# Patient Record
Sex: FEMALE | Race: WHITE | NOT HISPANIC OR LATINO | Employment: OTHER
[De-identification: names, ages, dates, MRNs, and addresses within clinical notes are randomized per-mention and may not be internally consistent; named-entity substitution may affect disease eponyms.]

---

## 2018-01-22 NOTE — TELEPHONE ENCOUNTER
APPT INFO    Date /Time: 3/7/18   Reason for Appt: Right Hip AVN/R Leg Weakness/R Sided Sciatica   Ref Provider/Clinic: Self   Are there internal records? Yes/No?  IF YES, list clinic names: No   Are there outside records? Yes/No? Yes     Patient Contact (Y/N) & Call Details: Yes  [facility] [place] and [facility] Iglesias; h/o R L4-5 HL & D 9/15; L L5-S1 HL & MD 1/01 -- See CareEverywhere tab in EPIC  CDI MR 12/5/17   Action: Reviewed CE records and requested imaging     OUTSIDE RECORDS CHECKLIST     CLINIC NAME COMMENTS REC (x) IMG (x)   [facility] OFFICE NOTES: 1/5/18, 7/24/15  RADIOLOGY: 1/8/18 MR R Hip, 10/27/17 & 6/7/13 Bone Density, 7/24/15 MR L Spine  ER/HOSP: 9/2/15 Hosp, 7/24/15 ED  OP NOTES: 9/2/15 Right L4-5 HL & D, 1/2001 Left L5-S1 HL & MD x x   CDI  na x

## 2018-01-29 ENCOUNTER — HEALTH MAINTENANCE LETTER (OUTPATIENT)
Age: 63
End: 2018-01-29

## 2018-01-29 NOTE — TELEPHONE ENCOUNTER
Records Received From: [facility]    Date/Exam/Location  (specify location if different)   Operative Notes & Implant Records: 1/14/00-Left L-5-S1 hemilaminectomy & MD

## 2018-03-07 ENCOUNTER — RADIANT APPOINTMENT (OUTPATIENT)
Dept: GENERAL RADIOLOGY | Facility: CLINIC | Age: 63
End: 2018-03-07
Attending: ORTHOPAEDIC SURGERY
Payer: COMMERCIAL

## 2018-03-07 ENCOUNTER — PRE VISIT (OUTPATIENT)
Dept: ORTHOPEDICS | Facility: CLINIC | Age: 63
End: 2018-03-07

## 2018-03-07 ENCOUNTER — OFFICE VISIT (OUTPATIENT)
Dept: ORTHOPEDICS | Facility: CLINIC | Age: 63
End: 2018-03-07
Payer: COMMERCIAL

## 2018-03-07 VITALS — WEIGHT: 133 LBS | HEIGHT: 62 IN | BODY MASS INDEX: 24.48 KG/M2

## 2018-03-07 DIAGNOSIS — M25.551 HIP PAIN, RIGHT: ICD-10-CM

## 2018-03-07 DIAGNOSIS — M16.11 PRIMARY OSTEOARTHRITIS OF RIGHT HIP: Primary | ICD-10-CM

## 2018-03-07 DIAGNOSIS — M25.551 HIP PAIN, RIGHT: Primary | ICD-10-CM

## 2018-03-07 RX ORDER — LYSINE HCL 500 MG
1 TABLET ORAL
COMMUNITY
Start: 2015-04-09

## 2018-03-07 RX ORDER — ASPIRIN 81 MG/1
TABLET ORAL
COMMUNITY
Start: 2015-09-03

## 2018-03-07 RX ORDER — FOLIC ACID 1 MG/1
TABLET ORAL
COMMUNITY
Start: 2007-11-08

## 2018-03-07 ASSESSMENT — HOOS JR
WHAT AMOUNT OF HIP PAIN HAVE YOU EXPERIENCED THE LAST WEEK DURING THE FOLLOWING ACTIVITIES: 1
WALKING ON UNEVEN SURFACE: 2
LYING IN BED (TURNING OVER, MAINTAINING HIP POSITION): 3
RISING FROM SITTING: 3
HOOS JR TOTAL INTERVAL SCORE: 64.66
SITTING: 1
BENDING TO THE FLOOR TO PICK UP OBJECT: 2
HOOS JR FUNCTION SCORE: 1
GOING UP OR DOWN STAIRS: 3

## 2018-03-07 ASSESSMENT — ENCOUNTER SYMPTOMS
ARTHRALGIAS: 1
MUSCLE WEAKNESS: 1
MUSCLE CRAMPS: 1
NECK PAIN: 1
BACK PAIN: 1
JOINT SWELLING: 0
MYALGIAS: 1
STIFFNESS: 1

## 2018-03-07 NOTE — MR AVS SNAPSHOT
"              After Visit Summary   3/7/2018    Jennifer Dubon    MRN: [id number]           Patient Information     Date Of Birth          1955        Visit Information        Provider Department      3/7/2018 3:00 PM Bruce Gordon MD [facility]        Today's Diagnoses     Primary osteoarthritis of right hip    -  1       Follow-ups after your visit        Who to contact     Please call your clinic at [phone] to:    Ask questions about your health    Make or cancel appointments    Discuss your medicines    Learn about your test results    Speak to your doctor            Additional Information About Your Visit        MyChart Information     [employer] gives you secure access to your electronic health record. If you see a primary care provider, you can also send messages to your care team and make appointments. If you have questions, please call your primary care clinic.  If you do not have a primary care provider, please call [phone] and they will assist you.      [employer] is an electronic gateway that provides easy, online access to your medical records. With [employer], you can request a clinic appointment, read your test results, renew a prescription or communicate with your care team.     To access your existing account, please contact your [facility] or call [phone] for assistance.        Care EveryWhere ID     This is your Care EveryWhere ID. This could be used by other organizations to access your [place] medical records  [id number]        Your Vitals Were     Height BMI (Body Mass Index)                1.575 m (5' 2\") 24.33 kg/m2           Blood Pressure from Last 3 Encounters:   No data found for BP    Weight from Last 3 Encounters:   03/07/18 60.3 kg (133 lb)              Today, you had the following     No orders found for display       Primary Care Provider    None Specified       No primary provider on file.        Equal Access to " Services     [facility]: Hadii aad ku hadaddyvishnu Medrano, wagwynda luqadaha, qaybta kajoniroldan butler . So [facility] [phone].    ATENCIÓN: Si habla español, tiene a smith disposición servicios gratuitos de asistencia lingüística. Llame al [phone].    We comply with applicable federal civil rights laws and [place] laws. We do not discriminate on the basis of race, color, national origin, age, disability, sex, sexual orientation, or gender identity.            Thank you!     Thank you for choosing [facility]  for your care. Our goal is always to provide you with excellent care. Hearing back from our patients is one way we can continue to improve our services. Please take a few minutes to complete the written survey that you may receive in the mail after your visit with us. Thank you!             Your Updated Medication List - Protect others around you: Learn how to safely use, store and throw away your medicines at www.disposemymeds.org.          This list is accurate as of 3/7/18 11:59 PM.  Always use your most recent med list.                   Brand Name Dispense Instructions for use Diagnosis    aspirin EC 81 MG EC tablet      Resume on 9/5/15        CALCIUM 600+D PLUS MINERALS 600-400 MG-UNIT Tabs      Take 1 tablet by mouth        folic acid 1 MG tablet    FOLVITE          Selenium 100 MCG Caps      Take 100 mcg by mouth        vitamin D3 1000 UNITS Caps      Take 1 capsule by mouth

## 2018-03-07 NOTE — NURSING NOTE
"Reason For Visit:   Chief Complaint   Patient presents with     Consult     Right hip AVN.        Primary MD: No primary care provider on file.        Ht 1.575 m (5' 2\")  Wt 60.3 kg (133 lb)  BMI 24.33 kg/m2      Current Outpatient Prescriptions   Medication     aspirin EC 81 MG EC tablet     Calcium Carbonate-Vit D-Min (CALCIUM 600+D PLUS MINERALS) 600-400 MG-UNIT TABS     Cholecalciferol (VITAMIN D3) 1000 UNITS CAPS     folic acid (FOLVITE) 1 MG tablet     Selenium 100 MCG CAPS     No current facility-administered medications for this visit.           Allergies   Allergen Reactions     Sulfa Drugs Rash           Lorraine Zimmerman LPN    "

## 2018-03-07 NOTE — LETTER
3/7/2018       RE: Jennifer Dubon  [address] [facility] [address]     Dear Colleague,    Thank you for referring your patient, Jennifer Dubon, to the [facility] at [facility]. Please see a copy of my visit note below.    [facility], Orthopaedic Surgery, Arthritis, Hip and Knee Replacement    Jennifer Dubon MRN# [id number]   Age: 62 year old YOB: 1955     Requesting physician: Referred Self              History of Present Illness:   Jennifer Dubon is a 62 year old year old female who presents today for evaluation and management of right hip pain.  One is a very pleasant 62-year-old woman who has had right hip and back issues for quite some time.  She has a history of 2 prior lumbar decompression surgeries.  She is also been followed for lumbar degenerative disease and is considering having a lower lumbar spinal fusion.  In the workup process for that she was found to have end-stage arthritis of her right hip.  She notes that currently she has pain localized to her right groin.  She notes this pain is worse with weightbearing activities, particularly if she goes for longer walks.  Thus far she has not done any treatment in particular for the hip and groin pain.  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  She also notes having severe recurrent lower lumbar pain that is severely disabling when this type of pain occurs.  However, this pain is less frequent.  She has not had any prior injections to her right hip.  She occasionally takes anti-inflammatory medications.  She is very active and otherwise healthy and enjoys exercises.             Past Medical History:   There is no problem list on file for this patient.    No past medical history on file.  Prior history of blood clot: none  Prior history of bleeding problems: none  Prior history of anesthetic complications:  "none  Currently on opioids:  none  History of Diabetes: no           Past Surgical History:     Past Surgical History:   Procedure Laterality Date     BACK SURGERY       HC SACROPLASTY              Social History:     Social History     Social History     Marital status:      Spouse name: N/A     Number of children: N/A     Years of education: N/A     Occupational History     Not on file.     Social History Main Topics     Smoking status: Never Smoker     Smokeless tobacco: Never Used     Alcohol use Yes     Drug use: No     Sexual activity: Yes     Partners: Male     Birth control/ protection: Post-menopausal     Other Topics Concern     Not on file     Social History Narrative     No narrative on file     Smoking: Non smoker.           Family History:       Family History   Problem Relation Age of Onset     CANCER Sister      Other Cancer Sister      Hypertension Father      Hyperlipidemia Father      Hyperlipidemia Mother      Coronary Artery Disease Mother             Medications:     Current Outpatient Prescriptions   Medication Sig     aspirin EC 81 MG EC tablet Resume on 9/5/15     Calcium Carbonate-Vit D-Min (CALCIUM 600+D PLUS MINERALS) 600-400 MG-UNIT TABS Take 1 tablet by mouth     Cholecalciferol (VITAMIN D3) 1000 UNITS CAPS Take 1 capsule by mouth     folic acid (FOLVITE) 1 MG tablet      Selenium 100 MCG CAPS Take 100 mcg by mouth     No current facility-administered medications for this visit.             Review of Systems:   A comprehensive 10 point review of systems (constitutional, ENT, cardiac, peripheral vascular, lymphatic, respiratory, GI, , Musculoskeletal, skin, Neurological) was performed and found to be negative except as described in this note.     Also see intake form completed by patient.             Physical Exam:     EXAMINATION pertinent findings:   VITAL SIGNS: Height 1.575 m (5' 2\"), weight 60.3 kg (133 lb).  Body mass index is 24.33 kg/(m^2).  GEN: AOx3, cooperative, no " distress  RESP: non labored breathing   ABD: benign   SKIN: grossly normal   LYMPHATIC: grossly normal   NEURO: grossly normal   VASCULAR: satisfactory perfusion of all extremities  MUSCULOSKELETAL:   She walks with a mildly antalgic gait with decreased stance phase on the right lower extremity.  Negative Trendelenburg sign.  She has mild pain with active straight leg raise which localizes to the groin.  Her hip range of motion is from 0-100 with 10  of internal rotation 40  of external rotation.  She has pain localized to the groin with flexion and internal rotation.  She has no pain with knee range of motion.  She has a negative passive straight leg raise.  She has no pain with range of motion of her left lower extremity.  She has no right knee tenderness palpation.  Ankle plantarflexion and dorsiflexion is intact.  She has normal sensation throughout her foot.  She has no tenderness palpation over the greater trochanter bilaterally.             Data:   Imaging:   Plain imaging was reviewed demonstrating end-stage bone-on-bone right hip arthritis.  She also has notable degenerative lumbar disease.         Assessment and Plan:   Assessment:  Jennifer is a very pleasant 62-year-old woman with end-stage right hip arthritis as well as degenerative lumbar disease.  She has been followed by neurosurgery for her degenerative lumbar disease.  She is considering going lumbar spinal fusion later this fall.  We had a long discussion regarding the natural history of her right hip arthritis as well as ongoing management options including surgical versus nonsurgical.  We discussed hip replacement in detail including the procedure, the implants, the longevity, the risks and benefits of surgery.  We also discussed the unique characteristics of considering total hip replacement in the setting of end-stage degenerative lumbar disease and spinal fusion.  We discussed a slightly higher risk of dislocation.  We discussed whether or not  it would be better to pursue hip replacement prior to or after lumbar spinal fusion.  My opinion is that it would be best to address which ever bothers her more.  At this point she does quite well with both, however when she has the episodes of lumbar pain she has severe shooting very debilitating pain and is considering undergoing lumbar surgery prior to that.  From a hip replacement standpoint this would be acceptable.  We discussed that after lumbar fusion total hip replacement is a slightly higher risk of dislocation.  If we are to do the hip replacement first and the hip was stable in a certain position we discussed that realign her spine could put her at higher risk for dislocation.  We further discussed that in some scenarios we would consider using a different construct such as dual mobility to further minimize the risk of dislocation in the setting of end-stage lumbar disease.  At this point she is considering proceeding with lumbar spinal fusion based on the recommendations of the neurosurgeon she is following.  I will see her back in clinic and she will keep us updated as to how her symptoms are progressing over time.  Otherwise she can remain in touch by email she is emailed in the past.  All of her questions were answered and she satisfied with this treatment.    Bruce Gordon M.D.     Arthritis and Joint Replacement  Department of Orthopaedic Surgery, [facility]  [email or web address]  [phone] (pager)           Review of Systems:       Answers for HPI/ROS submitted by the patient on 3/7/2018   General Symptoms: No  Skin Symptoms: No  HENT Symptoms: No  EYE SYMPTOMS: No  HEART SYMPTOMS: No  LUNG SYMPTOMS: No  INTESTINAL SYMPTOMS: No  URINARY SYMPTOMS: No  GYNECOLOGIC SYMPTOMS: No  BREAST SYMPTOMS: No  SKELETAL SYMPTOMS: Yes  BLOOD SYMPTOMS: No  NERVOUS SYSTEM SYMPTOMS: No  MENTAL HEALTH SYMPTOMS: No  Back pain: Yes  Muscle aches: Yes  Neck pain: Yes  Swollen joints:  No  Joint pain: Yes  Bone pain: Yes  Muscle cramps: Yes  Muscle weakness: Yes  Joint stiffness: Yes  Bone fracture: No      Again, thank you for allowing me to participate in the care of your patient.      Sincerely,    Bruce Gordon MD

## 2018-03-07 NOTE — LETTER
Date:March 15, 2018      Patient was self referred, no letter generated. Do not send.        [facility] Health Information

## 2018-03-14 NOTE — PROGRESS NOTES
[facility], Orthopaedic Surgery, Arthritis, Hip and Knee Replacement    Jennifer Dubon MRN# [id number]   Age: 62 year old YOB: 1955     Requesting physician: Referred Self              History of Present Illness:   Jennifer Dubon is a 62 year old year old female who presents today for evaluation and management of right hip pain.  One is a very pleasant 62-year-old woman who has had right hip and back issues for quite some time.  She has a history of 2 prior lumbar decompression surgeries.  She is also been followed for lumbar degenerative disease and is considering having a lower lumbar spinal fusion.  In the workup process for that she was found to have end-stage arthritis of her right hip.  She notes that currently she has pain localized to her right groin.  She notes this pain is worse with weightbearing activities, particularly if she goes for longer walks.  Thus far she has not done any treatment in particular for the hip and groin pain.  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  She also notes having severe recurrent lower lumbar pain that is severely disabling when this type of pain occurs.  However, this pain is less frequent.  She has not had any prior injections to her right hip.  She occasionally takes anti-inflammatory medications.  She is very active and otherwise healthy and enjoys exercises.             Past Medical History:   There is no problem list on file for this patient.    No past medical history on file.  Prior history of blood clot: none  Prior history of bleeding problems: none  Prior history of anesthetic complications: none  Currently on opioids:  none  History of Diabetes: no           Past Surgical History:     Past Surgical History:   Procedure Laterality Date     BACK SURGERY       HC SACROPLASTY              Social History:     Social History     Social History     Marital status:      Spouse  "name: N/A     Number of children: N/A     Years of education: N/A     Occupational History     Not on file.     Social History Main Topics     Smoking status: Never Smoker     Smokeless tobacco: Never Used     Alcohol use Yes     Drug use: No     Sexual activity: Yes     Partners: Male     Birth control/ protection: Post-menopausal     Other Topics Concern     Not on file     Social History Narrative     No narrative on file     Smoking: Non smoker.           Family History:       Family History   Problem Relation Age of Onset     CANCER Sister      Other Cancer Sister      Hypertension Father      Hyperlipidemia Father      Hyperlipidemia Mother      Coronary Artery Disease Mother             Medications:     Current Outpatient Prescriptions   Medication Sig     aspirin EC 81 MG EC tablet Resume on 9/5/15     Calcium Carbonate-Vit D-Min (CALCIUM 600+D PLUS MINERALS) 600-400 MG-UNIT TABS Take 1 tablet by mouth     Cholecalciferol (VITAMIN D3) 1000 UNITS CAPS Take 1 capsule by mouth     folic acid (FOLVITE) 1 MG tablet      Selenium 100 MCG CAPS Take 100 mcg by mouth     No current facility-administered medications for this visit.             Review of Systems:   A comprehensive 10 point review of systems (constitutional, ENT, cardiac, peripheral vascular, lymphatic, respiratory, GI, , Musculoskeletal, skin, Neurological) was performed and found to be negative except as described in this note.     Also see intake form completed by patient.             Physical Exam:     EXAMINATION pertinent findings:   VITAL SIGNS: Height 1.575 m (5' 2\"), weight 60.3 kg (133 lb).  Body mass index is 24.33 kg/(m^2).  GEN: AOx3, cooperative, no distress  RESP: non labored breathing   ABD: benign   SKIN: grossly normal   LYMPHATIC: grossly normal   NEURO: grossly normal   VASCULAR: satisfactory perfusion of all extremities  MUSCULOSKELETAL:   She walks with a mildly antalgic gait with decreased stance phase on the right lower " extremity.  Negative Trendelenburg sign.  She has mild pain with active straight leg raise which localizes to the groin.  Her hip range of motion is from 0-100 with 10  of internal rotation 40  of external rotation.  She has pain localized to the groin with flexion and internal rotation.  She has no pain with knee range of motion.  She has a negative passive straight leg raise.  She has no pain with range of motion of her left lower extremity.  She has no right knee tenderness palpation.  Ankle plantarflexion and dorsiflexion is intact.  She has normal sensation throughout her foot.  She has no tenderness palpation over the greater trochanter bilaterally.             Data:   Imaging:   Plain imaging was reviewed demonstrating end-stage bone-on-bone right hip arthritis.  She also has notable degenerative lumbar disease.         Assessment and Plan:   Assessment:  Jennifer is a very pleasant 62-year-old woman with end-stage right hip arthritis as well as degenerative lumbar disease.  She has been followed by neurosurgery for her degenerative lumbar disease.  She is considering going lumbar spinal fusion later this fall.  We had a long discussion regarding the natural history of her right hip arthritis as well as ongoing management options including surgical versus nonsurgical.  We discussed hip replacement in detail including the procedure, the implants, the longevity, the risks and benefits of surgery.  We also discussed the unique characteristics of considering total hip replacement in the setting of end-stage degenerative lumbar disease and spinal fusion.  We discussed a slightly higher risk of dislocation.  We discussed whether or not it would be better to pursue hip replacement prior to or after lumbar spinal fusion.  My opinion is that it would be best to address which ever bothers her more.  At this point she does quite well with both, however when she has the episodes of lumbar pain she has severe shooting very  debilitating pain and is considering undergoing lumbar surgery prior to that.  From a hip replacement standpoint this would be acceptable.  We discussed that after lumbar fusion total hip replacement is a slightly higher risk of dislocation.  If we are to do the hip replacement first and the hip was stable in a certain position we discussed that realign her spine could put her at higher risk for dislocation.  We further discussed that in some scenarios we would consider using a different construct such as dual mobility to further minimize the risk of dislocation in the setting of end-stage lumbar disease.  At this point she is considering proceeding with lumbar spinal fusion based on the recommendations of the neurosurgeon she is following.  I will see her back in clinic and she will keep us updated as to how her symptoms are progressing over time.  Otherwise she can remain in touch by email she is emailed in the past.  All of her questions were answered and she satisfied with this treatment.    Bruce Gordon M.D.     Arthritis and Joint Replacement  Department of Orthopaedic Surgery, [facility]  [email or web address]  [phone] (pager)           Review of Systems:       Answers for HPI/ROS submitted by the patient on 3/7/2018   General Symptoms: No  Skin Symptoms: No  HENT Symptoms: No  EYE SYMPTOMS: No  HEART SYMPTOMS: No  LUNG SYMPTOMS: No  INTESTINAL SYMPTOMS: No  URINARY SYMPTOMS: No  GYNECOLOGIC SYMPTOMS: No  BREAST SYMPTOMS: No  SKELETAL SYMPTOMS: Yes  BLOOD SYMPTOMS: No  NERVOUS SYSTEM SYMPTOMS: No  MENTAL HEALTH SYMPTOMS: No  Back pain: Yes  Muscle aches: Yes  Neck pain: Yes  Swollen joints: No  Joint pain: Yes  Bone pain: Yes  Muscle cramps: Yes  Muscle weakness: Yes  Joint stiffness: Yes  Bone fracture: No

## 2020-03-11 ENCOUNTER — HEALTH MAINTENANCE LETTER (OUTPATIENT)
Age: 65
End: 2020-03-11

## 2021-01-03 ENCOUNTER — HEALTH MAINTENANCE LETTER (OUTPATIENT)
Age: 66
End: 2021-01-03

## 2021-02-26 ENCOUNTER — DOCUMENTATION ONLY (OUTPATIENT)
Dept: CARE COORDINATION | Facility: CLINIC | Age: 66
End: 2021-02-26

## 2021-02-26 ASSESSMENT — ACTIVITIES OF DAILY LIVING (ADL)
ADL_MEAN: 2.23
ADL_SUBSCALE_SCORE: 44.11
ADL_SUM: 38

## 2021-02-26 ASSESSMENT — HOOS S4: HOW SEVERE IS YOUR HIP JOINT STIFFNESS AFTER FIRST WAKENING IN THE MORNING?: MODERATE

## 2021-03-01 DIAGNOSIS — M25.551 RIGHT HIP PAIN: Primary | ICD-10-CM

## 2021-03-03 ENCOUNTER — OFFICE VISIT (OUTPATIENT)
Dept: ORTHOPEDICS | Facility: CLINIC | Age: 66
End: 2021-03-03
Payer: COMMERCIAL

## 2021-03-03 ENCOUNTER — ANCILLARY PROCEDURE (OUTPATIENT)
Dept: GENERAL RADIOLOGY | Facility: CLINIC | Age: 66
End: 2021-03-03
Attending: ORTHOPAEDIC SURGERY
Payer: COMMERCIAL

## 2021-03-03 DIAGNOSIS — M16.11 PRIMARY LOCALIZED OSTEOARTHRITIS OF RIGHT HIP: Primary | ICD-10-CM

## 2021-03-03 DIAGNOSIS — M25.551 RIGHT HIP PAIN: ICD-10-CM

## 2021-03-03 PROCEDURE — 99214 OFFICE O/P EST MOD 30 MIN: CPT | Performed by: ORTHOPAEDIC SURGERY

## 2021-03-03 PROCEDURE — 73502 X-RAY EXAM HIP UNI 2-3 VIEWS: CPT | Mod: RT | Performed by: RADIOLOGY

## 2021-03-03 RX ORDER — ROSUVASTATIN CALCIUM 10 MG/1
10 TABLET, COATED ORAL
COMMUNITY
Start: 2020-02-01

## 2021-03-03 NOTE — PROGRESS NOTES
[facility], Orthopaedic Surgery, Arthritis, Hip and Knee Replacement    Jennifer Dubon MRN# [id number]   Age: 62 year old YOB: 1955     Requesting physician: Referred Self              History of Present Illness:   Jennifer returns for follow-up for right hip osteoarthritis.  I last saw her a couple years ago.  At that point she was noted to have degenerative right hip arthritis as well as some ongoing issues with her back.  Following that visit she did not pursue any further treatment for the back.  She did also not had any intervention for the hip.  She retired from dentistry in 2019.  Over the last few months she has had a great deal of increased right hip pain.  She does not have any ongoing back pain.  She does have persistent numbness in the right shin.  The symptoms have been stable since her prior back surgery in 2015.  She has otherwise no changes to her back symptoms or any new radicular type symptoms.  More recently, the symptoms that have bothered her her pain in her groin and buttock and lateral hip.  The symptoms are worse with weightbearing activities.  She also notes increasing difficulty with her gait.  She notes that she has an antalgic gait due to pain on the right hip.  She is not using any assistive devices.  It has become more more difficult to remain active.  She can no longer walk long distances.  She takes anti-inflammatory medications which do help with her symptoms somewhat.  Due to the severity of the symptoms and increased impact is having on the quality of her life she is interested in considering more definitive treatment management options at this time.           Past Medical History:   There is no problem list on file for this patient.    No past medical history on file.  Prior history of blood clot: none  Prior history of bleeding problems: none  Prior history of anesthetic complications: none  Currently on opioids:  none  History of Diabetes: no           Past  Surgical History:     Past Surgical History:   Procedure Laterality Date     BACK SURGERY       HC SACROPLASTY              Social History:     Social History     Socioeconomic History     Marital status:      Spouse name: Not on file     Number of children: Not on file     Years of education: Not on file     Highest education level: Not on file   Occupational History     Not on file   Social Needs     Financial resource strain: Not on file     Food insecurity     Worry: Not on file     Inability: Not on file     Transportation needs     Medical: Not on file     Non-medical: Not on file   Tobacco Use     Smoking status: Never Smoker     Smokeless tobacco: Never Used   Substance and Sexual Activity     Alcohol use: Yes     Drug use: No     Sexual activity: Yes     Partners: Male     Birth control/protection: Post-menopausal   Lifestyle     Physical activity     Days per week: Not on file     Minutes per session: Not on file     Stress: Not on file   Relationships     Social connections     Talks on phone: Not on file     Gets together: Not on file     Attends Sabianist service: Not on file     Active member of club or organization: Not on file     Attends meetings of clubs or organizations: Not on file     Relationship status: Not on file     Intimate partner violence     Fear of current or ex partner: Not on file     Emotionally abused: Not on file     Physically abused: Not on file     Forced sexual activity: Not on file   Other Topics Concern     Not on file   Social History Narrative     Not on file     Smoking: Non smoker.           Family History:       Family History   Problem Relation Age of Onset     Cancer Sister      Other Cancer Sister      Hypertension Father      Hyperlipidemia Father      Hyperlipidemia Mother      Coronary Artery Disease Mother             Medications:     Current Outpatient Medications   Medication Sig     aspirin EC 81 MG EC tablet Resume on 9/5/15     Calcium Carbonate-Vit  D-Min (CALCIUM 600+D PLUS MINERALS) 600-400 MG-UNIT TABS Take 1 tablet by mouth     Cholecalciferol (VITAMIN D3) 1000 UNITS CAPS Take 1 capsule by mouth     folic acid (FOLVITE) 1 MG tablet      rosuvastatin (CRESTOR) 10 MG tablet Take 10 mg by mouth     Selenium 100 MCG CAPS Take 100 mcg by mouth     No current facility-administered medications for this visit.                 Physical Exam:     EXAMINATION pertinent findings:   VITAL SIGNS: There were no vitals taken for this visit.  There is no height or weight on file to calculate BMI.  GEN: AOx3, cooperative, no distress  RESP: non labored breathing   ABD: benign   SKIN: grossly normal   LYMPHATIC: grossly normal   NEURO: grossly normal   VASCULAR: satisfactory perfusion of all extremities  MUSCULOSKELETAL:   She walks with an antalgic gait with decreased stance phase on the right lower extremity.  Negative Trendelenburg sign.  She has mild pain with active straight leg raise which localizes to the groin.  Her hip range of motion is from 0-100 with 10  of internal rotation 40  of external rotation.  She has pain localized to the groin with flexion and internal rotation.  She has no pain with knee range of motion.  She has a negative passive straight leg raise.  She has no pain with range of motion of her left lower extremity.  She has no right knee tenderness palpation.  Ankle plantarflexion and dorsiflexion is intact.  She has normal sensation throughout her foot.  She has no tenderness palpation over the greater trochanter bilaterally.  She does have slight decrease sensation over the right shin.  She has a negative passive straight leg raise.  She has no pain with lumbar flexion and extension.             Data:   Imaging:   Plain imaging was reviewed demonstrating end-stage bone-on-bone right hip arthritis.  She also has notable degenerative lumbar disease.         Assessment and Plan:   Assessment:  Jennifer is a very pleasant 62-year-old woman with end-stage  right hip arthritis as well as degenerative lumbar disease.  At this point, the symptoms related to the arthritis of her hips seem to be much more painful and having a much greater impact on her quality of life.  Her back symptoms have been stable now for many years.  Given the severity of the symptoms related to the hip I think would be reasonable to consider hip replacement.  We discussed hip replacement in detail including the surgery, recovery process, implants, long-term outcomes.  We reviewed the risks and benefits of surgery.  With regards to her back we discussed that there may be a slightly higher risk of dislocation due to some spinal stiffness.  However I do not think this is appreciably higher and we discussed that in general the risk is quite low.  We did discuss that any symptoms related to her back could be slightly worse after hip replacement due to positioning or postural changes.  Nonetheless I think this is also quite a low risk.  Should her back symptoms worsen in the future to the point where she required spinal realignment or fusion that may increase the risk of future dislocation.  Nonetheless, her symptoms are severe and we believe that the benefits of the surgery outweigh these risks and she would like to proceed with hip replacement.  Reviewed the risks and benefits of doing the surgery at [facility] and she was interested in pursuing that option.  We will work on getting surgery scheduled and she will me know if she has any questions or concerns in the meantime.    Bruce Gordon M.D.     Arthritis and Joint Replacement  Department of Orthopaedic Surgery, [facility]  [email or web address]  [phone] (pager)           Review of Systems:       Answers for HPI/ROS submitted by the patient on 3/7/2018   General Symptoms: No  Skin Symptoms: No  HENT Symptoms: No  EYE SYMPTOMS: No  HEART SYMPTOMS: No  LUNG SYMPTOMS: No  INTESTINAL SYMPTOMS: No  URINARY SYMPTOMS:  No  GYNECOLOGIC SYMPTOMS: No  BREAST SYMPTOMS: No  SKELETAL SYMPTOMS: Yes  BLOOD SYMPTOMS: No  NERVOUS SYSTEM SYMPTOMS: No  MENTAL HEALTH SYMPTOMS: No  Back pain: Yes  Muscle aches: Yes  Neck pain: Yes  Swollen joints: No  Joint pain: Yes  Bone pain: Yes  Muscle cramps: Yes  Muscle weakness: Yes  Joint stiffness: Yes  Bone fracture: No

## 2021-03-03 NOTE — LETTER
3/3/2021         RE: Jennifer Dubon  [address] [facility] [address]        Dear Colleague,    Thank you for referring your patient, Jennifer Dubon, to the [facility] [place]. Please see a copy of my visit note below.    [facility], Orthopaedic Surgery, Arthritis, Hip and Knee Replacement    Jennifer Dubon MRN# [id number]   Age: 62 year old YOB: 1955     Requesting physician: Referred Self              History of Present Illness:   Jennifer returns for follow-up for right hip osteoarthritis.  I last saw her a couple years ago.  At that point she was noted to have degenerative right hip arthritis as well as some ongoing issues with her back.  Following that visit she did not pursue any further treatment for the back.  She did also not had any intervention for the hip.  She retired from dentistry in 2019.  Over the last few months she has had a great deal of increased right hip pain.  She does not have any ongoing back pain.  She does have persistent numbness in the right shin.  The symptoms have been stable since her prior back surgery in 2015.  She has otherwise no changes to her back symptoms or any new radicular type symptoms.  More recently, the symptoms that have bothered her her pain in her groin and buttock and lateral hip.  The symptoms are worse with weightbearing activities.  She also notes increasing difficulty with her gait.  She notes that she has an antalgic gait due to pain on the right hip.  She is not using any assistive devices.  It has become more more difficult to remain active.  She can no longer walk long distances.  She takes anti-inflammatory medications which do help with her symptoms somewhat.  Due to the severity of the symptoms and increased impact is having on the quality of her life she is interested in considering more definitive treatment management options at this time.           Past Medical History:   There is no problem list on file  for this patient.    No past medical history on file.  Prior history of blood clot: none  Prior history of bleeding problems: none  Prior history of anesthetic complications: none  Currently on opioids:  none  History of Diabetes: no           Past Surgical History:     Past Surgical History:   Procedure Laterality Date     BACK SURGERY       HC SACROPLASTY              Social History:     Social History     Socioeconomic History     Marital status:      Spouse name: Not on file     Number of children: Not on file     Years of education: Not on file     Highest education level: Not on file   Occupational History     Not on file   Social Needs     Financial resource strain: Not on file     Food insecurity     Worry: Not on file     Inability: Not on file     Transportation needs     Medical: Not on file     Non-medical: Not on file   Tobacco Use     Smoking status: Never Smoker     Smokeless tobacco: Never Used   Substance and Sexual Activity     Alcohol use: Yes     Drug use: No     Sexual activity: Yes     Partners: Male     Birth control/protection: Post-menopausal   Lifestyle     Physical activity     Days per week: Not on file     Minutes per session: Not on file     Stress: Not on file   Relationships     Social connections     Talks on phone: Not on file     Gets together: Not on file     Attends [employer] service: Not on file     Active member of club or organization: Not on file     Attends meetings of clubs or organizations: Not on file     Relationship status: Not on file     Intimate partner violence     Fear of current or ex partner: Not on file     Emotionally abused: Not on file     Physically abused: Not on file     Forced sexual activity: Not on file   Other Topics Concern     Not on file   Social History Narrative     Not on file     Smoking: Non smoker.           Family History:       Family History   Problem Relation Age of Onset     Cancer Sister      Other Cancer Sister      Hypertension  Father      Hyperlipidemia Father      Hyperlipidemia Mother      Coronary Artery Disease Mother             Medications:     Current Outpatient Medications   Medication Sig     aspirin EC 81 MG EC tablet Resume on 9/5/15     Calcium Carbonate-Vit D-Min (CALCIUM 600+D PLUS MINERALS) 600-400 MG-UNIT TABS Take 1 tablet by mouth     Cholecalciferol (VITAMIN D3) 1000 UNITS CAPS Take 1 capsule by mouth     folic acid (FOLVITE) 1 MG tablet      rosuvastatin (CRESTOR) 10 MG tablet Take 10 mg by mouth     Selenium 100 MCG CAPS Take 100 mcg by mouth     No current facility-administered medications for this visit.                 Physical Exam:     EXAMINATION pertinent findings:   VITAL SIGNS: There were no vitals taken for this visit.  There is no height or weight on file to calculate BMI.  GEN: AOx3, cooperative, no distress  RESP: non labored breathing   ABD: benign   SKIN: grossly normal   LYMPHATIC: grossly normal   NEURO: grossly normal   VASCULAR: satisfactory perfusion of all extremities  MUSCULOSKELETAL:   She walks with an antalgic gait with decreased stance phase on the right lower extremity.  Negative Trendelenburg sign.  She has mild pain with active straight leg raise which localizes to the groin.  Her hip range of motion is from 0-100 with 10  of internal rotation 40  of external rotation.  She has pain localized to the groin with flexion and internal rotation.  She has no pain with knee range of motion.  She has a negative passive straight leg raise.  She has no pain with range of motion of her left lower extremity.  She has no right knee tenderness palpation.  Ankle plantarflexion and dorsiflexion is intact.  She has normal sensation throughout her foot.  She has no tenderness palpation over the greater trochanter bilaterally.  She does have slight decrease sensation over the right shin.  She has a negative passive straight leg raise.  She has no pain with lumbar flexion and extension.             Data:    Imaging:   Plain imaging was reviewed demonstrating end-stage bone-on-bone right hip arthritis.  She also has notable degenerative lumbar disease.         Assessment and Plan:   Assessment:  Jennifer is a very pleasant 62-year-old woman with end-stage right hip arthritis as well as degenerative lumbar disease.  At this point, the symptoms related to the arthritis of her hips seem to be much more painful and having a much greater impact on her quality of life.  Her back symptoms have been stable now for many years.  Given the severity of the symptoms related to the hip I think would be reasonable to consider hip replacement.  We discussed hip replacement in detail including the surgery, recovery process, implants, long-term outcomes.  We reviewed the risks and benefits of surgery.  With regards to her back we discussed that there may be a slightly higher risk of dislocation due to some spinal stiffness.  However I do not think this is appreciably higher and we discussed that in general the risk is quite low.  We did discuss that any symptoms related to her back could be slightly worse after hip replacement due to positioning or postural changes.  Nonetheless I think this is also quite a low risk.  Should her back symptoms worsen in the future to the point where she required spinal realignment or fusion that may increase the risk of future dislocation.  Nonetheless, her symptoms are severe and we believe that the benefits of the surgery outweigh these risks and she would like to proceed with hip replacement.  Reviewed the risks and benefits of doing the surgery at [facility] and she was interested in pursuing that option.  We will work on getting surgery scheduled and she will me know if she has any questions or concerns in the meantime.    Bruce Gordon M.D.     Arthritis and Joint Replacement  Department of Orthopaedic Surgery, [facility]  [email or web address]  [phone] (pager)            Review of Systems:       Answers for HPI/ROS submitted by the patient on 3/7/2018   General Symptoms: No  Skin Symptoms: No  HENT Symptoms: No  EYE SYMPTOMS: No  HEART SYMPTOMS: No  LUNG SYMPTOMS: No  INTESTINAL SYMPTOMS: No  URINARY SYMPTOMS: No  GYNECOLOGIC SYMPTOMS: No  BREAST SYMPTOMS: No  SKELETAL SYMPTOMS: Yes  BLOOD SYMPTOMS: No  NERVOUS SYSTEM SYMPTOMS: No  MENTAL HEALTH SYMPTOMS: No  Back pain: Yes  Muscle aches: Yes  Neck pain: Yes  Swollen joints: No  Joint pain: Yes  Bone pain: Yes  Muscle cramps: Yes  Muscle weakness: Yes  Joint stiffness: Yes  Bone fracture: No

## 2021-04-25 ENCOUNTER — HEALTH MAINTENANCE LETTER (OUTPATIENT)
Age: 66
End: 2021-04-25

## 2021-10-10 ENCOUNTER — HEALTH MAINTENANCE LETTER (OUTPATIENT)
Age: 66
End: 2021-10-10

## 2022-03-26 ENCOUNTER — HEALTH MAINTENANCE LETTER (OUTPATIENT)
Age: 67
End: 2022-03-26

## 2022-05-21 ENCOUNTER — HEALTH MAINTENANCE LETTER (OUTPATIENT)
Age: 67
End: 2022-05-21

## 2022-09-17 ENCOUNTER — HEALTH MAINTENANCE LETTER (OUTPATIENT)
Age: 67
End: 2022-09-17

## 2023-06-04 ENCOUNTER — HEALTH MAINTENANCE LETTER (OUTPATIENT)
Age: 68
End: 2023-06-04

## 2024-05-05 ENCOUNTER — HEALTH MAINTENANCE LETTER (OUTPATIENT)
Age: 69
End: 2024-05-05